# Patient Record
Sex: FEMALE | Race: WHITE | Employment: STUDENT | URBAN - METROPOLITAN AREA
[De-identification: names, ages, dates, MRNs, and addresses within clinical notes are randomized per-mention and may not be internally consistent; named-entity substitution may affect disease eponyms.]

---
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
## 2023-03-02 RX ORDER — NORGESTIMATE AND ETHINYL ESTRADIOL 0.25-0.035
1 KIT ORAL DAILY
Qty: 84 TABLET | Refills: 2 | Status: SHIPPED | OUTPATIENT
Start: 2023-03-02

## 2023-03-03 NOTE — TELEPHONE ENCOUNTER
Patient is 20 y/o, no record of  PAP SMEAR. Please review; protocol failed/no protocol. Requested Prescriptions   Pending Prescriptions Disp Refills    NORGESTIMATE-ETH ESTRADIOL 0.25-35 MG-MCG Oral Tab [Pharmacy Med Name: Norgestimate-Eth Estradiol Oral Tablet 0.25-35 MG-MCG] 84 tablet 0     Sig: Take 1 tablet by mouth daily.        Gynecology Medication Protocol Failed - 3/2/2023  3:27 PM        Failed - Pass dependent on manual look-up of last PAP and patient compliance with PAP follow up recommendations        Passed - In person appointment or virtual visit in the past 12 mos or appointment in next 3 mos     Recent Outpatient Visits              2 months ago Encounter for routine adult health examination without abnormal findings    [address], [address], [place]    Office Visit    9 months ago     [address], [address], [facility]    Nurse Only    1 year ago Examination, routine, over 25years of age    [address], [address], [place], [address] [place] [address] Outpatient Visits              2 months ago Encounter for routine adult health examination without abnormal findings    [address], [address] [facility], [address]    9 months ago     [address], [address], [facility]    Nurse Only    1 year ago Examination, routine, over 25years of age    [address], [address], [place], [place]    Office Visit

## 2023-03-25 ENCOUNTER — TELEPHONE (OUTPATIENT)
Facility: CLINIC | Age: 18
End: 2023-03-25

## 2023-03-25 RX ORDER — AMOXICILLIN AND CLAVULANATE POTASSIUM 875; 125 MG/1; MG/1
1 TABLET, FILM COATED ORAL 2 TIMES DAILY
Qty: 14 TABLET | Refills: 0 | Status: SHIPPED | OUTPATIENT
Start: 2023-03-25 | End: 2023-04-01

## 2023-03-25 NOTE — TELEPHONE ENCOUNTER
Condition update:  Call from Shane, patient's mom, on [facility], verified patient name/. Patient is student at [employer]. Has had sinus symptoms greater than a week, went to [facility] yesterday, dx with sinus infection but only prescribed Mucinex/ OTCs for symptoms. Patient reporting severe pain in teeth on right side, fever, headache. Mom asking if Dr Katty Buckner could prescribe antibiotic? [facility] is closed for the weekend. No known allergies. Local [facility] is at [address] [place] [address], phone [phone]. Dr Katty Buckner, please advise?

## 2023-03-25 NOTE — TELEPHONE ENCOUNTER
Notified mom Yarely Bellamy, of orders. She will call patient. States patient already takes probiotic. Mom is very appreciative of Dr Amy Sue response.

## 2023-11-18 RX ORDER — NORGESTIMATE AND ETHINYL ESTRADIOL 0.25-0.035
1 KIT ORAL DAILY
Qty: 84 TABLET | Refills: 2 | Status: SHIPPED | OUTPATIENT
Start: 2023-11-18

## 2023-11-18 NOTE — TELEPHONE ENCOUNTER
Refill passed per [employer], [facility] protocol. Requested Prescriptions   Pending Prescriptions Disp Refills    NORGESTIMATE-ETH ESTRADIOL 0.25-35 MG-MCG Oral Tab [Pharmacy Med Name: Norgestimate-Eth Estradiol Oral Tablet 0.25-35 MG-MCG] 84 tablet 0     Sig: Take 1 tablet by mouth daily.        Gynecology Medication Protocol Failed - 11/17/2023 11:28 AM        Failed - Pass dependent on manual look-up of last PAP and patient compliance with PAP follow up recommendations        Passed - In person appointment or virtual visit in the past 12 mos or appointment in next 3 mos     Recent Outpatient Visits              11 months ago Encounter for routine adult health examination without abnormal findings    [address], [place]    Office Visit    1 year ago     [facility] [place] [address], [facility]    Nurse Only    2 years ago Examination, routine, over 25years of age    [place] [address], [place], [address] [place] Visit          Future Appointments         Provider Department Appt Notes    In 2 months Warden Colvin [facility] [place] [address], [facility] Physical

## 2024-01-08 ENCOUNTER — OFFICE VISIT (OUTPATIENT)
Facility: CLINIC | Age: 19
End: 2024-01-08
Payer: COMMERCIAL

## 2024-01-08 VITALS
BODY MASS INDEX: 21.29 KG/M2 | DIASTOLIC BLOOD PRESSURE: 60 MMHG | HEART RATE: 80 BPM | OXYGEN SATURATION: 98 % | WEIGHT: 120.13 LBS | HEIGHT: 63 IN | SYSTOLIC BLOOD PRESSURE: 104 MMHG

## 2024-01-08 DIAGNOSIS — Z30.41 ENCOUNTER FOR BIRTH CONTROL PILLS MAINTENANCE: ICD-10-CM

## 2024-01-08 DIAGNOSIS — Z00.00 ENCOUNTER FOR GENERAL ADULT MEDICAL EXAMINATION W/O ABNORMAL FINDINGS: Primary | ICD-10-CM

## 2024-01-08 RX ORDER — SPIRONOLACTONE 50 MG/1
50 TABLET, FILM COATED ORAL DAILY
COMMUNITY
Start: 2023-04-26

## 2024-01-08 RX ORDER — NORGESTIMATE AND ETHINYL ESTRADIOL 0.25-0.035
1 KIT ORAL DAILY
Qty: 84 TABLET | Refills: 3 | Status: SHIPPED | OUTPATIENT
Start: 2024-01-08

## 2024-01-08 RX ORDER — METRONIDAZOLE 7.5 MG/G
GEL TOPICAL 2 TIMES DAILY
COMMUNITY
Start: 2023-12-13

## 2024-01-08 NOTE — PROGRESS NOTES
Subjective:   Jeannette Parra is a 18 year old female who presents for Physical (Patient comes to the office for an annual physical examination. )     Patient presents for annual wellness exam    Birth control methods: OCPs. Doing well. Has not missed any pills. Needs refill. LMP: 12/27/2023    Going to travel to [place] in March 2024. Is wondering if needs any other prophylaxis or vaccines. Will not be doing any jungle excursions. Already has all childhood vaccinations including Hepatitis A. Going to [facility]. Staying at a resort.    History/Other:    Chief Complaint Reviewed and Verified  Nursing Notes Reviewed and   Verified  Tobacco Reviewed  Allergies Reviewed  Medications Reviewed    Problem List Reviewed  Medical History Reviewed  Surgical History   Reviewed  Family History Reviewed  Social History Reviewed         Tobacco:  She has never smoked tobacco.    Current Outpatient Medications   Medication Sig Dispense Refill    metroNIDAZOLE 0.75 % External Gel Apply topically 2 (two) times daily.      spironolactone 50 MG Oral Tab Take 1 tablet (50 mg total) by mouth daily.      Norgestimate-Eth Estradiol 0.25-35 MG-MCG Oral Tab TAKE 1 TABLET BY MOUTH DAILY 84 tablet 2    doxycycline 50 MG Oral Cap Take 50 mg by mouth daily.           Review of Systems:  Review of Systems   Constitutional: Negative.    HENT: Negative.     Eyes: Negative.    Respiratory: Negative.     Cardiovascular: Negative.    Gastrointestinal: Negative.    Genitourinary: Negative.    Musculoskeletal: Negative.    Skin: Negative.    Neurological: Negative.    Psychiatric/Behavioral: Negative.           Objective:   /60 (BP Location: Left arm, Patient Position: Sitting, Cuff Size: adult)   Pulse 80   Ht 5' 3\" (1.6 m)   Wt 120 lb 2 oz (54.5 kg)   LMP 12/27/2023   SpO2 98%   BMI 21.28 kg/m²  Estimated body mass index is 21.28 kg/m² as calculated from the following:    Height as of this encounter: 5' 3\" (1.6 m).    Weight as of this  encounter: 120 lb 2 oz (54.5 kg).  Physical Exam  Vitals and nursing note reviewed.   Constitutional:       General: She is not in acute distress.     Appearance: Normal appearance. She is not ill-appearing.   HENT:      Head: Normocephalic and atraumatic.      Right Ear: Tympanic membrane normal. There is no impacted cerumen.      Left Ear: Tympanic membrane normal. There is no impacted cerumen.      Mouth/Throat:      Mouth: Mucous membranes are moist.      Pharynx: Oropharynx is clear. No oropharyngeal exudate or posterior oropharyngeal erythema.   Eyes:      General:         Right eye: No discharge.         Left eye: No discharge.      Extraocular Movements: Extraocular movements intact.      Pupils: Pupils are equal, round, and reactive to light.   Cardiovascular:      Rate and Rhythm: Normal rate and regular rhythm.      Heart sounds: Normal heart sounds. No murmur heard.     No friction rub. No gallop.   Pulmonary:      Effort: Pulmonary effort is normal.      Breath sounds: Normal breath sounds. No wheezing, rhonchi or rales.   Abdominal:      General: Abdomen is flat. Bowel sounds are normal. There is no distension.      Palpations: Abdomen is soft. There is no mass.      Tenderness: There is no abdominal tenderness. There is no guarding or rebound.   Musculoskeletal:         General: Normal range of motion.      Cervical back: Normal range of motion.      Right lower leg: No edema.      Left lower leg: No edema.   Skin:     General: Skin is warm and dry.      Findings: No rash.   Neurological:      General: No focal deficit present.      Mental Status: She is alert. Mental status is at baseline.   Psychiatric:         Mood and Affect: Mood normal.         Behavior: Behavior normal.         Assessment & Plan:   1. Encounter for general adult medical examination w/o abnormal findings (Primary)  -     CBC With Differential With Platelet; Future; Expected date: 01/08/2024  -     Lipid Panel; Future; Expected  date: 01/08/2024  -     Comp Metabolic Panel (14); Future; Expected date: 01/08/2024  -     Chlamydia/Gc Amplification; Future; Expected date: 01/08/2024  -     Hemoglobin A1C; Future; Expected date: 01/08/2024      -patient is unsure if wants to do labs. Did get labs in 2022 which were normal. Discussed with patient given completely normal results does the option of getting or not getting labs.  -reviewed CDC travel recommendations. No extra prophylaxis or vaccines recommended for travel itinerary     Return in about 1 year (around 1/8/2025), or if symptoms worsen or fail to improve.    Nicolle Somers MD, 1/8/2024, 10:10 AM

## 2024-01-08 NOTE — PATIENT INSTRUCTIONS
Don't forget to come back fasted for your labs, 8 hours nothing to eat or drink except water. Lab hours are from 7:30 a.m. -4:00 p.m. Monday through Friday.  You do not need an appointment

## 2024-11-08 ENCOUNTER — TELEPHONE (OUTPATIENT)
Facility: CLINIC | Age: 19
End: 2024-11-08

## 2024-11-08 NOTE — TELEPHONE ENCOUNTER
Incoming call from patient mother requesting a physical appointment .  Mother indicated that you had agreed to add the patient to your schedule .  Patient is available from 11/23/2024 -01/06/2025.    Please advice .

## 2024-11-08 NOTE — TELEPHONE ENCOUNTER
Holding for Dr Nguyen for when she returns on 11/11/2024. Just Fyi if patient inquires    Nicolle Somers MD, 11/08/24, 12:52 PM

## 2025-01-07 DIAGNOSIS — Z30.41 ENCOUNTER FOR BIRTH CONTROL PILLS MAINTENANCE: ICD-10-CM

## 2025-01-07 RX ORDER — NORGESTIMATE AND ETHINYL ESTRADIOL 0.25-0.035
1 KIT ORAL DAILY
Qty: 180 TABLET | Refills: 0 | Status: SHIPPED | OUTPATIENT
Start: 2025-01-07

## 2025-01-07 NOTE — TELEPHONE ENCOUNTER
Failed Protocol/No Protocol.    Please also see note that patient placed for medication.     Patient comment: My appointment is this Friday but I am almost out of my birth control and would love to fill it asap! I am going abroad next semester and will be out of the country until May, is it possible to increase the supply so I have enough to last me 5 months?     Requested Prescriptions   Pending Prescriptions Disp Refills    Norgestimate-Eth Estradiol 0.25-35 MG-MCG Oral Tab 84 tablet 3     Sig: Take 1 tablet by mouth daily.       Gynecology Medication Protocol Failed - 1/7/2025  4:16 PM        Failed - PASS-PENDING LAST PAP WNL--VIA MANUAL LOOKUP        Passed - Physical or Pelvic/Breast in past 12 or next 3 mos--VIA MANUAL LOOKUP             Future Appointments         Provider Department Appt Notes    In 3 days Adriana Nguyen DO [facility] physical .  ok to add per  .  SMS send to update ins -1/6          Recent Outpatient Visits              1 year ago Encounter for general adult medical examination w/o abnormal findings    [facility] Nicolle Somers MD    Office Visit    2 years ago Encounter for routine adult health examination without abnormal findings    [facility] Adriana Nguyen DO    Office Visit    2 years ago     [facility] [place]    Nurse Only    3 years ago Examination, routine, over 18 years of age    [facility] Adriana Nguyen DO    Office Visit

## 2025-01-10 ENCOUNTER — OFFICE VISIT (OUTPATIENT)
Facility: CLINIC | Age: 20
End: 2025-01-10
Payer: COMMERCIAL

## 2025-01-10 ENCOUNTER — LAB ENCOUNTER (OUTPATIENT)
Dept: LAB | Age: 20
End: 2025-01-10
Attending: FAMILY MEDICINE
Payer: COMMERCIAL

## 2025-01-10 VITALS
WEIGHT: 116 LBS | HEIGHT: 63 IN | OXYGEN SATURATION: 100 % | DIASTOLIC BLOOD PRESSURE: 72 MMHG | BODY MASS INDEX: 20.55 KG/M2 | HEART RATE: 80 BPM | SYSTOLIC BLOOD PRESSURE: 116 MMHG

## 2025-01-10 DIAGNOSIS — Z23 NEED FOR VACCINATION: ICD-10-CM

## 2025-01-10 DIAGNOSIS — Z00.00 ENCOUNTER FOR ROUTINE ADULT HEALTH EXAMINATION WITHOUT ABNORMAL FINDINGS: Primary | ICD-10-CM

## 2025-01-10 DIAGNOSIS — Z00.00 ENCOUNTER FOR ROUTINE ADULT HEALTH EXAMINATION WITHOUT ABNORMAL FINDINGS: ICD-10-CM

## 2025-01-10 DIAGNOSIS — Z30.41 ENCOUNTER FOR SURVEILLANCE OF CONTRACEPTIVE PILLS: ICD-10-CM

## 2025-01-10 LAB
ALBUMIN SERPL-MCNC: 4.6 G/DL (ref 3.2–4.8)
ALBUMIN/GLOB SERPL: 1.7 {RATIO} (ref 1–2)
ALP LIVER SERPL-CCNC: 62 U/L
ALT SERPL-CCNC: 12 U/L
ANION GAP SERPL CALC-SCNC: 8 MMOL/L (ref 0–18)
AST SERPL-CCNC: 18 U/L (ref ?–34)
BASOPHILS # BLD AUTO: 0.05 X10(3) UL (ref 0–0.2)
BASOPHILS NFR BLD AUTO: 1.1 %
BILIRUB SERPL-MCNC: 0.3 MG/DL (ref 0.3–1.2)
BUN BLD-MCNC: 11 MG/DL (ref 9–23)
BUN/CREAT SERPL: 12.9 (ref 10–20)
CALCIUM BLD-MCNC: 9.9 MG/DL (ref 8.7–10.4)
CHLORIDE SERPL-SCNC: 106 MMOL/L (ref 98–112)
CHOLEST SERPL-MCNC: 175 MG/DL (ref ?–200)
CO2 SERPL-SCNC: 28 MMOL/L (ref 21–32)
CREAT BLD-MCNC: 0.85 MG/DL
DEPRECATED RDW RBC AUTO: 39.9 FL (ref 35.1–46.3)
EGFRCR SERPLBLD CKD-EPI 2021: 100 ML/MIN/1.73M2 (ref 60–?)
EOSINOPHIL # BLD AUTO: 0.12 X10(3) UL (ref 0–0.7)
EOSINOPHIL NFR BLD AUTO: 2.7 %
ERYTHROCYTE [DISTWIDTH] IN BLOOD BY AUTOMATED COUNT: 11.7 % (ref 11–15)
EST. AVERAGE GLUCOSE BLD GHB EST-MCNC: 108 MG/DL (ref 68–126)
FASTING PATIENT LIPID ANSWER: YES
FASTING STATUS PATIENT QL REPORTED: YES
GLOBULIN PLAS-MCNC: 2.7 G/DL (ref 2–3.5)
GLUCOSE BLD-MCNC: 87 MG/DL (ref 70–99)
HBA1C MFR BLD: 5.4 % (ref ?–5.7)
HCT VFR BLD AUTO: 39.9 %
HCV AB SERPL QL IA: NONREACTIVE
HDLC SERPL-MCNC: 86 MG/DL (ref 40–59)
HGB BLD-MCNC: 14.1 G/DL
IMM GRANULOCYTES # BLD AUTO: 0.01 X10(3) UL (ref 0–1)
IMM GRANULOCYTES NFR BLD: 0.2 %
LDLC SERPL CALC-MCNC: 74 MG/DL (ref ?–100)
LYMPHOCYTES # BLD AUTO: 1.86 X10(3) UL (ref 1–4)
LYMPHOCYTES NFR BLD AUTO: 41.2 %
MCH RBC QN AUTO: 32.6 PG (ref 26–34)
MCHC RBC AUTO-ENTMCNC: 35.3 G/DL (ref 31–37)
MCV RBC AUTO: 92.1 FL
MONOCYTES # BLD AUTO: 0.63 X10(3) UL (ref 0.1–1)
MONOCYTES NFR BLD AUTO: 13.9 %
NEUTROPHILS # BLD AUTO: 1.85 X10 (3) UL (ref 1.5–7.7)
NEUTROPHILS # BLD AUTO: 1.85 X10(3) UL (ref 1.5–7.7)
NEUTROPHILS NFR BLD AUTO: 40.9 %
NONHDLC SERPL-MCNC: 89 MG/DL (ref ?–130)
OSMOLALITY SERPL CALC.SUM OF ELEC: 293 MOSM/KG (ref 275–295)
PLATELET # BLD AUTO: 267 10(3)UL (ref 150–450)
POTASSIUM SERPL-SCNC: 4.2 MMOL/L (ref 3.5–5.1)
PROT SERPL-MCNC: 7.3 G/DL (ref 5.7–8.2)
RBC # BLD AUTO: 4.33 X10(6)UL
SODIUM SERPL-SCNC: 142 MMOL/L (ref 136–145)
TRIGL SERPL-MCNC: 80 MG/DL (ref 30–149)
VLDLC SERPL CALC-MCNC: 12 MG/DL (ref 0–30)
WBC # BLD AUTO: 4.5 X10(3) UL (ref 4–11)

## 2025-01-10 PROCEDURE — 86803 HEPATITIS C AB TEST: CPT | Performed by: FAMILY MEDICINE

## 2025-01-10 PROCEDURE — 90472 IMMUNIZATION ADMIN EACH ADD: CPT | Performed by: FAMILY MEDICINE

## 2025-01-10 PROCEDURE — 83036 HEMOGLOBIN GLYCOSYLATED A1C: CPT | Performed by: FAMILY MEDICINE

## 2025-01-10 PROCEDURE — 80053 COMPREHEN METABOLIC PANEL: CPT | Performed by: FAMILY MEDICINE

## 2025-01-10 PROCEDURE — 99395 PREV VISIT EST AGE 18-39: CPT | Performed by: FAMILY MEDICINE

## 2025-01-10 PROCEDURE — 90715 TDAP VACCINE 7 YRS/> IM: CPT | Performed by: FAMILY MEDICINE

## 2025-01-10 PROCEDURE — 90471 IMMUNIZATION ADMIN: CPT | Performed by: FAMILY MEDICINE

## 2025-01-10 PROCEDURE — 80061 LIPID PANEL: CPT | Performed by: FAMILY MEDICINE

## 2025-01-10 PROCEDURE — 85025 COMPLETE CBC W/AUTO DIFF WBC: CPT | Performed by: FAMILY MEDICINE

## 2025-01-10 PROCEDURE — 90656 IIV3 VACC NO PRSV 0.5 ML IM: CPT | Performed by: FAMILY MEDICINE

## 2025-01-10 RX ORDER — TRETINOIN 0.25 MG/G
1 CREAM TOPICAL NIGHTLY
Qty: 45 G | Refills: 2 | Status: SHIPPED | OUTPATIENT
Start: 2025-01-10

## 2025-01-10 RX ORDER — TRETINOIN 0.25 MG/G
CREAM TOPICAL
COMMUNITY
Start: 2023-04-06 | End: 2025-01-10

## 2025-01-10 NOTE — PROGRESS NOTES
HPI:   Jeannette Parra is a 19 year old female who presents for a complete physical exam.     Leaving on 1/16 for a study abroad program in [place].   Following with a dermatologist in [place], and her acne has been well controlled. Doing well on her birth control pill, and it has helped lighten her menstrual cycles and has improved her lower back pain.     Last pap: Never had one before   Menses: Regular, monthly cycles   Contraception:  OCP's    History of STD's: None  History of intimate partner violence: None  Family hx of breast, ovarian, cervical or colon CA: Great aunts with breast cancer   Diet and exercise: Does not eat breakfast most days, but sometimes has yogurt or a smoothie. Has a lot of chicken sausage and pasta, and also makes a lot of  Presents frozen meals. Drinks a lot of water. Does walk a lot.   Immunizations:  Tdap: 2015, Flu: Would like it today, HPV: Completed, Covid: Completed 3 doses    REVIEW OF SYSTEMS:   GENERAL: feels well otherwise   SKIN: denies any unusual skin lesions  EYES: no vision problems  BREAST: no lumps or masses, no nipple discharge   LUNGS: denies shortness of breath  CARDIOVASCULAR: denies chest pain  GI: denies abdominal pain,  No constipation or diarrhea, no hematochezia  : denies dysuria, vaginal discharge or itching  NEURO: denies headaches  PSYCHE: denies depression but anxiety          Current Outpatient Medications   Medication Sig Dispense Refill    tretinoin 0.025 % External Cream Apply 1 Application topically nightly. 45 g 2    Norgestimate-Eth Estradiol 0.25-35 MG-MCG Oral Tab Take 1 tablet by mouth daily. 180 tablet 0    metroNIDAZOLE 0.75 % External Gel Apply topically 2 (two) times daily.      spironolactone 50 MG Oral Tab Take 1 tablet (50 mg total) by mouth daily.       Allergies[1]   Past Medical History:    Acne    Anxiety      History reviewed. No pertinent surgical history.   Family History   Problem Relation Age of Onset    No Known Problems Mother      Cancer Maternal Grandfather         Lung      Social History:   Social History     Socioeconomic History    Marital status: Single   Tobacco Use    Smoking status: Never    Smokeless tobacco: Never   Vaping Use    Vaping status: Never Used   Substance and Sexual Activity    Alcohol use: Not Currently     Comment: Occasional    Drug use: Never    Sexual activity: Never   Other Topics Concern    Caffeine Concern No    Exercise No    Seat Belt No    Special Diet No    Stress Concern No    Weight Concern No     Occ: [employer] 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 : No. Children: None.         EXAM:     Wt Readings from Last 6 Encounters:   01/10/25 116 lb (52.6 kg)   01/08/24 120 lb 2 oz (54.5 kg)   12/20/22 117 lb (53.1 kg) (29%, Z= -0.54)*   11/11/21 112 lb 9.6 oz (51.1 kg) (25%, Z= -0.68)*     * Growth percentiles are based on [facility] (Girls, 2-20 Years) data.     Body mass index is 20.55 kg/m².   /72   Pulse 80   Ht 5' 3\" (1.6 m)   Wt 116 lb (52.6 kg)   LMP 01/07/2025   SpO2 100%   BMI 20.55 kg/m²     GENERAL: well developed, well nourished, in no apparent distress   SKIN: no rashes, no suspicious lesions  HEENT: atraumatic, normocephalic, throat clear; normal dentition  EYES: PERRLA, EOMI, conjunctiva are clear  NECK: supple, no adenopathy or thyroid masses   BREAST: no dominant or suspicious mass, no nipple discharge  LUNGS: clear to auscultation  CARDIO: RRR without murmur  GI: good bowel sounds, no masses, HSM or tenderness  : declines   EXTREMITIES: no edema    No results found for: \"CHOLEST\", \"HDL\", \"LDL\", \"TRIGLY\"   ASSESSMENT AND PLAN:   Jeannette Parra is a 19 year old female who presents for a complete physical exam.  Encounter Diagnoses   Name Primary?    Encounter for routine adult health examination without abnormal findings Yes    Encounter for surveillance of contraceptive pills     Need for vaccination      Orders Placed This Encounter   Procedures    Lipid  Panel [E]    Hemoglobin A1C (Glycohemoglobin) [E]    Comp Metabolic Panel (14) [E]    CBC W Differential W Platelet [E]    HCV Antibody [E]    INFLUENZA VACCINE, TRI, PRESERV FREE, 0.5 ML    TdaP (Boostrix/Adacel) Vaccine (> 7 Y)     Continue OCP's for improvement of cramps and acne, and tolerating it well.  Discussed in great detail risks of combined OCP's, including but not limited to DVT, PE, MI and stroke, and patient expressed understanding and acceptance of these risks.    Discussed with patient the following:  -Monthly breast self-exam  -Breast cancer screening/mammograms and clinical breast exams  -Cervical cancer screening/pap smears: not indicated as she has never been sexually active   -Colon cancer screening/colonoscopy  -Adequate calcium and Vitamin D intake to prevent osteoporosis  -Bone density screening for osteopenia/osteoporosis  -Healthy diet including adequate intake of vegetables and fruits, appropriate portion sizes, minimizing highly concentrated carbohydrate foods  -Exercising 30 minutes a day most days of the week   -Diabetes screening which she desires  -Cholesterol screening which she desires  -Recommendation for yearly influenza vaccine  -Need for HPV vaccination series: completed   -Need for Tdap once as an adult and Td booster every 10 years  -Contraception: OCP's  -STI screening (GC/Chlamydia/HIV): Not indicated  -Hepatitis C screening for all adults between the ages of 18 and 79: Check with labs     All questions were answered during the visit and the patient verbalizes understanding. She will return in one year for next WWE or sooner as needed.    Meds & Refills for this Visit:  Requested Prescriptions     Signed Prescriptions Disp Refills    tretinoin 0.025 % External Cream 45 g 2     Sig: Apply 1 Application topically nightly.       Imaging & Consults:  INFLUENZA VACCINE, TRI, PRESERV FREE, 0.5 ML  TETANUS, DIPHTHERIA TOXOIDS AND ACELLULAR PERTUSIS VACCINE (TDAP), >7 YEARS, IM  USE    Adriana Nguyen DO  1/10/2025  8:36 AM         [1] No Known Allergies

## 2025-06-19 DIAGNOSIS — Z30.41 ENCOUNTER FOR BIRTH CONTROL PILLS MAINTENANCE: ICD-10-CM

## 2025-06-23 RX ORDER — NORGESTIMATE AND ETHINYL ESTRADIOL 0.25-0.035
1 KIT ORAL DAILY
Qty: 168 TABLET | Refills: 1 | Status: SHIPPED | OUTPATIENT
Start: 2025-06-23

## 2025-07-15 ENCOUNTER — TELEPHONE (OUTPATIENT)
Facility: CLINIC | Age: 20
End: 2025-07-15

## 2025-07-15 RX ORDER — CIPROFLOXACIN 750 MG/1
750 TABLET, FILM COATED ORAL 2 TIMES DAILY
Qty: 10 TABLET | Refills: 0 | Status: CANCELLED | OUTPATIENT
Start: 2025-07-15 | End: 2025-07-20

## 2025-07-15 NOTE — TELEPHONE ENCOUNTER
If she truly has no access to having someone else evaluate it I could send in Cipro as that is the only oral antibiotic that will work for Pseudomonas infections, which are typical in the earlobe.  Would need a local pharmacy, however.

## 2025-07-15 NOTE — TELEPHONE ENCOUNTER
Action Requested: Summary for Provider     []  Critical Lab, Recommendations Needed  [] Need Additional Advice  [x]   FYI    []   Need Orders  [] Need Medications Sent to Pharmacy  []  Other     SUMMARY: Currently in [place], working at a [employer].   Will seek care locally for treatment of possible infection.   Injury to Ear      Reason for call: Acute  Onset: few days ago      Patient called office. Date of birth and full name both confirmed.     Helix Ear piercing from about 2 years ago     While swimming few days ago, earring was pulled out. Lost Earring. Another person lent her an earring to use to keep it open.   But it started to swell around the borrowed earring.     Today - at [facility], the nurse practitioner evaluated it.   It is more red, has pus drainage.   Painful     Asking for Dr Nguyen to Send Antibiotics.     Triaged and advised care advice     Evaluation advised locally in [place].   Or have the Nurse Practitioner at [facility] provide the antibiotics - per patient - she would have to go to that [facility] for a visit - and they are in a remote area. Making it difficult to go to an Immediate Care as well.   RN also suggested to research local immediate cares, if virtual visits are offered.       Patient verbalizes understanding and is agreeable to instructions.     No future appointments.

## 2025-07-16 NOTE — TELEPHONE ENCOUNTER
Patient calling back today, she did go to IC near where she was and did get ABT so she does not need anything for this from us. She appreciates the follow up and wanted to call and make us aware.     No further action needed at this time.

## 2025-08-13 ENCOUNTER — TELEPHONE (OUTPATIENT)
Facility: CLINIC | Age: 20
End: 2025-08-13

## (undated) DIAGNOSIS — Z23 IMMUNIZATION DUE: Primary | ICD-10-CM